# Patient Record
Sex: FEMALE | Race: WHITE | NOT HISPANIC OR LATINO | URBAN - NONMETROPOLITAN AREA
[De-identification: names, ages, dates, MRNs, and addresses within clinical notes are randomized per-mention and may not be internally consistent; named-entity substitution may affect disease eponyms.]

---

## 2020-08-25 ENCOUNTER — OFFICE VISIT (OUTPATIENT)
Dept: URBAN - NONMETROPOLITAN AREA CLINIC 2 | Facility: CLINIC | Age: 60
End: 2020-08-25
Payer: MEDICARE

## 2020-08-25 ENCOUNTER — LAB OUTSIDE AN ENCOUNTER (OUTPATIENT)
Dept: URBAN - NONMETROPOLITAN AREA CLINIC 2 | Facility: CLINIC | Age: 60
End: 2020-08-25

## 2020-08-25 ENCOUNTER — TELEPHONE ENCOUNTER (OUTPATIENT)
Dept: URBAN - NONMETROPOLITAN AREA CLINIC 2 | Facility: CLINIC | Age: 60
End: 2020-08-25

## 2020-08-25 DIAGNOSIS — K63.89 BACTERIAL OVERGROWTH SYNDROME: ICD-10-CM

## 2020-08-25 DIAGNOSIS — K21.0 GASTROESOPHAGEAL REFLUX DISEASE WITH ESOPHAGITIS: ICD-10-CM

## 2020-08-25 DIAGNOSIS — Z12.11 COLON CANCER SCREENING: ICD-10-CM

## 2020-08-25 DIAGNOSIS — K63.9 COLON WALL THICKENING: ICD-10-CM

## 2020-08-25 PROCEDURE — 99244 OFF/OP CNSLTJ NEW/EST MOD 40: CPT | Performed by: NURSE PRACTITIONER

## 2020-08-25 PROCEDURE — 99204 OFFICE O/P NEW MOD 45 MIN: CPT | Performed by: NURSE PRACTITIONER

## 2020-08-25 RX ORDER — PANTOPRAZOLE SODIUM 40 MG/1
TAKE 1 TABLET (40 MG) BY ORAL ROUTE 2 TIMES PER DAY TABLET, DELAYED RELEASE ORAL
Qty: 180 TABLET | Refills: 3 | OUTPATIENT
Start: 2020-08-25

## 2020-08-25 RX ORDER — GABAPENTIN 300 MG/1
1 CAPSULE CAPSULE ORAL ONCE A DAY
Status: ACTIVE | COMMUNITY

## 2020-08-25 RX ORDER — SODIUM, POTASSIUM,MAG SULFATES 17.5-3.13G
354 ML AS DIRECTED SOLUTION, RECONSTITUTED, ORAL ORAL ONCE
Qty: 354 MILLILITER | Refills: 0 | OUTPATIENT
Start: 2020-08-25 | End: 2020-08-26

## 2020-08-25 NOTE — HPI-TODAY'S VISIT:
Mrs. cordova is a 59-year-old female referred by Dr. Carmen Velasquez for evaluation of colon cancer screening, abnormal CT with colon wall thickening, and reflux status post chemotherapy and radiation for metastatic breast cancer.  She has never had an EGD or colonoscopy in the past.  She just completed 16 weeks of chemotherapy and 8 weeks of radiation to her chest.  She has had increased heartburn and reflux.  She was a longtime smoker but stopped during therapy, she has relapsed a couple of times since after her dog passed.  She had a follow-up CT done in March of this year, this shows diffuse colon wall thickening with no specific lesions, the radiologist suggest this could be due to lack of distention.  She denies any rectal bleeding or mucus.  She states her bowels have been fairly normal through chemotherapy and radiation, she does not take anything regularly for these.  She has tried over-the-counter PPIs including Prilosec and Nexium for reflux with some relief, she denies any dysphagia or odynophagia.  She lost significant amount of weight during her therapy, however she has gained all of this back.  Today she is doing well otherwise but anxious to complete this test prior to her follow-up imaging in September.  MB

## 2020-09-03 ENCOUNTER — OFFICE VISIT (OUTPATIENT)
Dept: URBAN - NONMETROPOLITAN AREA SURGERY CENTER 1 | Facility: SURGERY CENTER | Age: 60
End: 2020-09-03
Payer: MEDICARE

## 2020-09-03 DIAGNOSIS — K63.89 BACTERIAL OVERGROWTH SYNDROME: ICD-10-CM

## 2020-09-03 DIAGNOSIS — K29.60 ADENOPAPILLOMATOSIS GASTRICA: ICD-10-CM

## 2020-09-03 DIAGNOSIS — R93.3 ABN FINDINGS-GI TRACT: ICD-10-CM

## 2020-09-03 PROCEDURE — G8907 PT DOC NO EVENTS ON DISCHARG: HCPCS | Performed by: INTERNAL MEDICINE

## 2020-09-03 PROCEDURE — 45385 COLONOSCOPY W/LESION REMOVAL: CPT | Performed by: INTERNAL MEDICINE

## 2020-09-03 PROCEDURE — G9937 DIG OR SURV COLSCO: HCPCS | Performed by: INTERNAL MEDICINE

## 2020-09-03 PROCEDURE — 43239 EGD BIOPSY SINGLE/MULTIPLE: CPT | Performed by: INTERNAL MEDICINE

## 2020-10-15 ENCOUNTER — OFFICE VISIT (OUTPATIENT)
Dept: URBAN - NONMETROPOLITAN AREA CLINIC 2 | Facility: CLINIC | Age: 60
End: 2020-10-15
Payer: MEDICARE

## 2020-10-15 VITALS
TEMPERATURE: 96.1 F | WEIGHT: 155 LBS | HEIGHT: 65 IN | SYSTOLIC BLOOD PRESSURE: 104 MMHG | DIASTOLIC BLOOD PRESSURE: 68 MMHG | BODY MASS INDEX: 25.83 KG/M2 | HEART RATE: 92 BPM

## 2020-10-15 DIAGNOSIS — Z12.11 COLON CANCER SCREENING: ICD-10-CM

## 2020-10-15 DIAGNOSIS — K21.00 CHRONIC REFLUX ESOPHAGITIS: ICD-10-CM

## 2020-10-15 DIAGNOSIS — K63.9 COLON WALL THICKENING: ICD-10-CM

## 2020-10-15 PROCEDURE — 99213 OFFICE O/P EST LOW 20 MIN: CPT | Performed by: INTERNAL MEDICINE

## 2020-10-15 PROCEDURE — G9903 PT SCRN TBCO ID AS NON USER: HCPCS | Performed by: INTERNAL MEDICINE

## 2020-10-15 PROCEDURE — 3017F COLORECTAL CA SCREEN DOC REV: CPT | Performed by: INTERNAL MEDICINE

## 2020-10-15 PROCEDURE — G8427 DOCREV CUR MEDS BY ELIG CLIN: HCPCS | Performed by: INTERNAL MEDICINE

## 2020-10-15 PROCEDURE — 1036F TOBACCO NON-USER: CPT | Performed by: INTERNAL MEDICINE

## 2020-10-15 RX ORDER — PANTOPRAZOLE SODIUM 40 MG/1
TAKE 1 TABLET (40 MG) BY ORAL ROUTE 2 TIMES PER DAY TABLET, DELAYED RELEASE ORAL
Qty: 180 TABLET | Refills: 3 | Status: ACTIVE | COMMUNITY
Start: 2020-08-25

## 2020-10-15 RX ORDER — GABAPENTIN 300 MG/1
1 CAPSULE CAPSULE ORAL ONCE A DAY
Status: ACTIVE | COMMUNITY

## 2020-10-15 NOTE — HPI-TODAY'S VISIT:
Mrs. cordova is a 59-year-old female referred by Dr. Carmen Velasquez for evaluation of colon cancer screening, abnormal CT with colon wall thickening, and reflux status post chemotherapy and radiation for metastatic breast cancer.  She has never had an EGD or colonoscopy in the past.  She just completed 16 weeks of chemotherapy and 8 weeks of radiation to her chest.  She has had increased heartburn and reflux.  She was a longtime smoker but stopped during therapy, she has relapsed a couple of times since after her dog passed.  She had a follow-up CT done in March of this year, this shows diffuse colon wall thickening with no specific lesions, the radiologist suggest this could be due to lack of distention.  She denies any rectal bleeding or mucus.  She states her bowels have been fairly normal through chemotherapy and radiation, she does not take anything regularly for these.  She has tried over-the-counter PPIs including Prilosec and Nexium for reflux with some relief, she denies any dysphagia or odynophagia.  She lost significant amount of weight during her therapy, however she has gained all of this back.  Today she is doing well otherwise but anxious to complete this test prior to her follow-up imaging in September.  MB  10/15/2020 Nilda presents for endoscopy and colonoscopy follow-up.  Her EGD does reveal reflux esophagitis and mild chronic gastritis.  Her colonoscopy is normal other than one small polyp, biopsies are benign along with diverticular disease.  Her reflux has improved tremendously on Protonix 40 mg twice daily, she is actually weaned down to once a day, but does take an extra dose as needed.  She continues to follow with oncology, she is due for repeat imaging this fall.  She is currently off therapy.  Today she is doing well otherwise with no new GI complaints.  MB

## 2021-04-15 ENCOUNTER — OFFICE VISIT (OUTPATIENT)
Dept: URBAN - NONMETROPOLITAN AREA CLINIC 2 | Facility: CLINIC | Age: 61
End: 2021-04-15
Payer: MEDICARE

## 2021-04-15 ENCOUNTER — DASHBOARD ENCOUNTERS (OUTPATIENT)
Age: 61
End: 2021-04-15

## 2021-04-15 VITALS
WEIGHT: 155 LBS | BODY MASS INDEX: 25.83 KG/M2 | DIASTOLIC BLOOD PRESSURE: 107 MMHG | TEMPERATURE: 97.1 F | HEIGHT: 65 IN | HEART RATE: 72 BPM | SYSTOLIC BLOOD PRESSURE: 155 MMHG

## 2021-04-15 DIAGNOSIS — K21.9 ACID REFLUX: ICD-10-CM

## 2021-04-15 DIAGNOSIS — K63.9 COLON WALL THICKENING: ICD-10-CM

## 2021-04-15 PROBLEM — 128524007: Status: ACTIVE | Noted: 2020-08-25

## 2021-04-15 PROBLEM — 266433003: Status: ACTIVE | Noted: 2020-08-25

## 2021-04-15 PROBLEM — 305058001: Status: ACTIVE | Noted: 2020-08-25

## 2021-04-15 PROCEDURE — 99214 OFFICE O/P EST MOD 30 MIN: CPT | Performed by: NURSE PRACTITIONER

## 2021-04-15 RX ORDER — FAMOTIDINE 20 MG/1
1 PO QHS TABLET ORAL QD
Qty: 90 TABLET | Refills: 3 | OUTPATIENT
Start: 2021-04-15

## 2021-04-15 RX ORDER — PANTOPRAZOLE SODIUM 40 MG/1
TAKE 1 TABLET (40 MG) BY ORAL ROUTE 2 TIMES PER DAY TABLET, DELAYED RELEASE ORAL
Qty: 180 TABLET | Refills: 3 | Status: ACTIVE | COMMUNITY
Start: 2020-08-25

## 2021-04-15 RX ORDER — GABAPENTIN 300 MG/1
1 CAPSULE CAPSULE ORAL ONCE A DAY
Status: ACTIVE | COMMUNITY

## 2021-04-15 NOTE — HPI-TODAY'S VISIT:
Mrs. cordova is a 59-year-old female referred by Dr. Carmen Velasquez for evaluation of colon cancer screening, abnormal CT with colon wall thickening, and reflux status post chemotherapy and radiation for metastatic breast cancer.  She has never had an EGD or colonoscopy in the past.  She just completed 16 weeks of chemotherapy and 8 weeks of radiation to her chest.  She has had increased heartburn and reflux.  She was a longtime smoker but stopped during therapy, she has relapsed a couple of times since after her dog passed.  She had a follow-up CT done in March of this year, this shows diffuse colon wall thickening with no specific lesions, the radiologist suggest this could be due to lack of distention.  She denies any rectal bleeding or mucus.  She states her bowels have been fairly normal through chemotherapy and radiation, she does not take anything regularly for these.  She has tried over-the-counter PPIs including Prilosec and Nexium for reflux with some relief, she denies any dysphagia or odynophagia.  She lost significant amount of weight during her therapy, however she has gained all of this back.  Today she is doing well otherwise but anxious to complete this test prior to her follow-up imaging in September.  MB  10/15/2020 Nilda presents for endoscopy and colonoscopy follow-up.  Her EGD does reveal reflux esophagitis and mild chronic gastritis.  Her colonoscopy is normal other than one small polyp, biopsies are benign along with diverticular disease.  Her reflux has improved tremendously on Protonix 40 mg twice daily, she is actually weaned down to once a day, but does take an extra dose as needed.  She continues to follow with oncology, she is due for repeat imaging this fall.  She is currently off therapy.  Today she is doing well otherwise with no new GI complaints.  MB 4/15/2021 Nilda presents for follow-up of reflux.  Since her last visit she has weaned her Protonix down to 20 mg daily.  She continues to smoke cigarettes.  She does have breakthrough heartburn in the evening depending on what she eats, particularly if she eats heavy fatty meals like taco salads.  Her insurance would not pay for pantoprazole 20 mg twice daily per her report.  She denies any further dysphagia.  Her bowels are moving regularly.  Today we had a long discussion regarding nutrition and tobacco abuse in regard to her reflux, she agrees she needs to work on cessation.  MB

## 2022-05-09 ENCOUNTER — ERX REFILL RESPONSE (OUTPATIENT)
Dept: URBAN - NONMETROPOLITAN AREA CLINIC 2 | Facility: CLINIC | Age: 62
End: 2022-05-09